# Patient Record
Sex: FEMALE | Race: WHITE
[De-identification: names, ages, dates, MRNs, and addresses within clinical notes are randomized per-mention and may not be internally consistent; named-entity substitution may affect disease eponyms.]

---

## 2018-07-15 ENCOUNTER — HOSPITAL ENCOUNTER (EMERGENCY)
Dept: HOSPITAL 74 - JER | Age: 23
Discharge: HOME | End: 2018-07-15
Payer: COMMERCIAL

## 2018-07-15 VITALS — TEMPERATURE: 98.3 F | HEART RATE: 68 BPM | DIASTOLIC BLOOD PRESSURE: 78 MMHG | SYSTOLIC BLOOD PRESSURE: 118 MMHG

## 2018-07-15 VITALS — BODY MASS INDEX: 26.5 KG/M2

## 2018-07-15 DIAGNOSIS — N92.5: Primary | ICD-10-CM

## 2018-07-15 LAB
ALBUMIN SERPL-MCNC: 3.9 G/DL (ref 3.4–5)
ALP SERPL-CCNC: 50 U/L (ref 45–117)
ALT SERPL-CCNC: 32 U/L (ref 12–78)
ANION GAP SERPL CALC-SCNC: 9 MMOL/L (ref 8–16)
APPEARANCE UR: (no result)
AST SERPL-CCNC: 26 U/L (ref 15–37)
BASOPHILS # BLD: 0.6 % (ref 0–2)
BILIRUB SERPL-MCNC: 0.5 MG/DL (ref 0.2–1)
BILIRUB UR STRIP.AUTO-MCNC: NEGATIVE MG/DL
BUN SERPL-MCNC: 9 MG/DL (ref 7–18)
CALCIUM SERPL-MCNC: 8.7 MG/DL (ref 8.5–10.1)
CHLORIDE SERPL-SCNC: 109 MMOL/L (ref 98–107)
CO2 SERPL-SCNC: 25 MMOL/L (ref 21–32)
COLOR UR: (no result)
CREAT SERPL-MCNC: 0.8 MG/DL (ref 0.55–1.02)
DEPRECATED RDW RBC AUTO: 13.9 % (ref 11.6–15.6)
EOSINOPHIL # BLD: 1.2 % (ref 0–4.5)
EPITH CASTS URNS QL MICRO: (no result) /HPF
GLUCOSE SERPL-MCNC: 84 MG/DL (ref 74–106)
HCT VFR BLD CALC: 44.3 % (ref 32.4–45.2)
HGB BLD-MCNC: 15 GM/DL (ref 10.7–15.3)
KETONES UR QL STRIP: (no result)
LEUKOCYTE ESTERASE UR QL STRIP.AUTO: (no result)
LYMPHOCYTES # BLD: 18.8 % (ref 8–40)
MCH RBC QN AUTO: 30.5 PG (ref 25.7–33.7)
MCHC RBC AUTO-ENTMCNC: 33.8 G/DL (ref 32–36)
MCV RBC: 90.3 FL (ref 80–96)
MONOCYTES # BLD AUTO: 7.7 % (ref 3.8–10.2)
MUCOUS THREADS URNS QL MICRO: (no result)
NEUTROPHILS # BLD: 71.7 % (ref 42.8–82.8)
NITRITE UR QL STRIP: NEGATIVE
PH UR: 5 [PH] (ref 5–8)
PLATELET # BLD AUTO: 234 K/MM3 (ref 134–434)
PMV BLD: 9.2 FL (ref 7.5–11.1)
POTASSIUM SERPLBLD-SCNC: 3.9 MMOL/L (ref 3.5–5.1)
PROT SERPL-MCNC: 7 G/DL (ref 6.4–8.2)
PROT UR QL STRIP: (no result)
PROT UR QL STRIP: NEGATIVE
RBC # BLD AUTO: 4.91 M/MM3 (ref 3.6–5.2)
SODIUM SERPL-SCNC: 143 MMOL/L (ref 136–145)
SP GR UR: 1.03 (ref 1–1.03)
UROBILINOGEN UR STRIP-MCNC: NEGATIVE MG/DL (ref 0.2–1)
WBC # BLD AUTO: 9.4 K/MM3 (ref 4–10)

## 2018-07-15 PROCEDURE — 3E0337Z INTRODUCTION OF ELECTROLYTIC AND WATER BALANCE SUBSTANCE INTO PERIPHERAL VEIN, PERCUTANEOUS APPROACH: ICD-10-PCS | Performed by: EMERGENCY MEDICINE

## 2018-07-15 NOTE — PDOC
Attending Attestation





- HPI


HPI: 





07/15/18 05:18


Patient is a 22 year old female no significant past medical history who 

presents to the ED with complaints of nausea and vomiting that began 5 days 

ago.  Patient reports being concerned that she is pregnant, stating she has 

taken multiple at home pregnancy tests to verify. She reports he last menstrual 

period was june of 2018.  Patient reports experiencing associated symptoms of 

bilateral breast tenderness.  





Denies chest pain, Sob. Denies an out of state travelling. Denie fevers, 

chills. Denies dysuria, hematuria, constipation. Denies any other symptoms. 





Allergies: None


Social history: No smoking. No alcohol. No illicit drugs. 


Surgical history: None


PMD: Not on staff. 








<Clinton Almonte - Last Filed: 07/15/18 05:18>





- Resident


Resident Name: Linda Nixon





- ED Attending Attestation


I have performed the following: I have examined & evaluated the patient, The 

case was reviewed & discussed with the resident, I agree w/resident's findings 

& plan





- Physicial Exam


PE: 





07/15/18 21:09


Agree with resident exam. Pt has normal exam





- Medical Decision Making





07/15/18 21:19


Pt wants to know if she is pregnant.  Her menses is 5 days late and she has a 

neg preg test.


SHe will follow with her OB/GYN





<Kya Vargas - Last Filed: 07/15/18 21:21>

## 2018-07-15 NOTE — PDOC
History of Present Illness





- General


Chief Complaint: Nausea/Vomiting


Stated Complaint: VOMITING


Time Seen by Provider: 07/15/18 01:52





- History of Present Illness


Initial Comments: 





07/15/18 02:10


Patient is a 22 year old female with no reported PMH who presents to our ED c/o 

5 day h/o nausea/vomiting.  Notes she has taken multiple urine pregnancy tests 

at home and is concerned she is pregnant.  LMP was mid June 2018.  Denies any 

associated abdominal cramping, dysuria/hematuria, increased urgency/frequency.  

C/o B/L breast tenderness.  No previous h/o pregnancy.





NKDA


Surgical: denies


Social: denies toxic habits.  


PMD: @ [facility]








Past History





- Past Medical History


Allergies/Adverse Reactions: 


 Allergies











Allergy/AdvReac Type Severity Reaction Status Date / Time


 


No Known Allergies Allergy   Verified 07/15/18 01:27











Home Medications: 


Ambulatory Orders





NK [No Known Home Medication]  07/15/18 











- Suicide/Smoking/Psychosocial Hx


Smoking History: Never smoked


Have you smoked in the past 12 months: No


Information on smoking cessation initiated: No


Hx Alcohol Use: No


Drug/Substance Use Hx: No





**Review of Systems





- Review of Systems


Constitutional: No: Chills, Fever


Respiratory: No: Cough, Shortness of Breath


Cardiac (ROS): No: Chest Pain, Lightheadedness, Palpitations, Syncope


ABD/GI: Yes: Nausea, Vomiting.  No: Constipated, Diarrhea, Abdominal cramping


: No: Burning, Dysuria, Frequency, Urgency





*Physical Exam





- Vital Signs


 Last Vital Signs











Temp Pulse Resp BP Pulse Ox


 


 98.2 F   88   20   115/78   99 


 


 07/15/18 01:28  07/15/18 01:28  07/15/18 01:28  07/15/18 01:28  07/15/18 01:28














- Physical Exam


General Appearance: Yes: Nourished, Appropriately Dressed


Neck: positive: Trachea midline, Supple


Respiratory/Chest: positive: Lungs Clear, Normal Breath Sounds


Cardiovascular: positive: S1, S2.  negative: Edema, JVD, Murmur


Vascular Pulses: Dorsalis-Pedis (R): 2+, Doralis-Pedis (L): 2+


Gastrointestinal/Abdominal: positive: Normal Bowel Sounds, Soft.  negative: 

Tenderness, Hernia, Mass


Musculoskeletal: negative: CVA Tenderness (R), CVA Tenderness (L)


Extremity: positive: Normal Capillary Refill, Normal Inspection


Integumentary: positive: Normal Color, Dry, Warm


Neurologic: positive: Fully Oriented, Alert





ED Treatment Course





- LABORATORY


CBC & Chemistry Diagram: 


 07/15/18 02:30





 07/15/18 02:30





Medical Decision Making





- Medical Decision Making





07/15/18 02:19


22 year old female presents with 5 day h/o nausea and vomiting and missed LMP.  

Will obtain serum pregnancy, CBC/CMP,  Type and Screen to assess Rh status as 

well as UA.  





07/15/18 03:22


A positive


B-HCG negative.  UA shows 1+ blood.  Will hydrate and plan for disposition 

home.  








I discussed the physical exam findings, ancillary test results and final 

diagnoses with the patient. I answered all of the patient's questions. The 

patient was satisfied with the care received and felt comfortable with the 

discharge plan and treatment plan. The patient will return to the Emergency 

Department with any new, persistent or worsening symptoms.

















*DC/Admit/Observation/Transfer


Diagnosis at time of Disposition: 


 H/O abnormal menstrual cycle








- Discharge Dispostion


Disposition: HOME


Condition at time of disposition: Good


Decision to Admit order: No





- Referrals





- Patient Instructions


Additional Instructions: 


Please follow up with your primary care doctor in the next 2-3 days for 

evaluation of your breast tenderness and missed menstrual cycle.





Return to the Emergency Department for any new/worsening/concerning symptoms. 





- Post Discharge Activity